# Patient Record
Sex: FEMALE | Race: WHITE | Employment: FULL TIME | URBAN - METROPOLITAN AREA
[De-identification: names, ages, dates, MRNs, and addresses within clinical notes are randomized per-mention and may not be internally consistent; named-entity substitution may affect disease eponyms.]

---

## 2017-04-27 ENCOUNTER — ANESTHESIA EVENT (OUTPATIENT)
Dept: SURGERY | Age: 52
End: 2017-04-27
Payer: COMMERCIAL

## 2017-04-28 ENCOUNTER — APPOINTMENT (OUTPATIENT)
Dept: GENERAL RADIOLOGY | Age: 52
End: 2017-04-28
Attending: ORTHOPAEDIC SURGERY
Payer: COMMERCIAL

## 2017-04-28 ENCOUNTER — ANESTHESIA (OUTPATIENT)
Dept: SURGERY | Age: 52
End: 2017-04-28
Payer: COMMERCIAL

## 2017-04-28 ENCOUNTER — HOSPITAL ENCOUNTER (OUTPATIENT)
Age: 52
Setting detail: OUTPATIENT SURGERY
Discharge: HOME | End: 2017-04-28
Attending: ORTHOPAEDIC SURGERY | Admitting: ORTHOPAEDIC SURGERY
Payer: COMMERCIAL

## 2017-04-28 VITALS
HEART RATE: 88 BPM | SYSTOLIC BLOOD PRESSURE: 118 MMHG | BODY MASS INDEX: 45.31 KG/M2 | WEIGHT: 240 LBS | DIASTOLIC BLOOD PRESSURE: 76 MMHG | TEMPERATURE: 97.8 F | HEIGHT: 61 IN | OXYGEN SATURATION: 93 % | RESPIRATION RATE: 16 BRPM

## 2017-04-28 LAB — GLUCOSE BLD STRIP.AUTO-MCNC: 139 MG/DL (ref 65–100)

## 2017-04-28 PROCEDURE — 76010010054 HC POST OP PAIN BLOCK: Performed by: ORTHOPAEDIC SURGERY

## 2017-04-28 PROCEDURE — 77030008593 HC TRAP FNGR MESH ALLN -B: Performed by: ORTHOPAEDIC SURGERY

## 2017-04-28 PROCEDURE — 77030008853 HC WRE K TRIM -B: Performed by: ORTHOPAEDIC SURGERY

## 2017-04-28 PROCEDURE — 74011250636 HC RX REV CODE- 250/636: Performed by: ORTHOPAEDIC SURGERY

## 2017-04-28 PROCEDURE — 77030020754 HC CUF TRNQT 2BLA STRY -B: Performed by: ORTHOPAEDIC SURGERY

## 2017-04-28 PROCEDURE — C1713 ANCHOR/SCREW BN/BN,TIS/BN: HCPCS | Performed by: ORTHOPAEDIC SURGERY

## 2017-04-28 PROCEDURE — 77030002966 HC SUT PDS J&J -A: Performed by: ORTHOPAEDIC SURGERY

## 2017-04-28 PROCEDURE — 82962 GLUCOSE BLOOD TEST: CPT

## 2017-04-28 PROCEDURE — 76942 ECHO GUIDE FOR BIOPSY: CPT | Performed by: ORTHOPAEDIC SURGERY

## 2017-04-28 PROCEDURE — 76010000161 HC OR TIME 1 TO 1.5 HR INTENSV-TIER 1: Performed by: ORTHOPAEDIC SURGERY

## 2017-04-28 PROCEDURE — 74011250636 HC RX REV CODE- 250/636: Performed by: ANESTHESIOLOGY

## 2017-04-28 PROCEDURE — 74011250636 HC RX REV CODE- 250/636

## 2017-04-28 PROCEDURE — 77030002933 HC SUT MCRYL J&J -A: Performed by: ORTHOPAEDIC SURGERY

## 2017-04-28 PROCEDURE — 77030003602 HC NDL NRV BLK BBMI -B: Performed by: ANESTHESIOLOGY

## 2017-04-28 PROCEDURE — 77030003867

## 2017-04-28 PROCEDURE — 77030002916 HC SUT ETHLN J&J -A: Performed by: ORTHOPAEDIC SURGERY

## 2017-04-28 PROCEDURE — 74011000250 HC RX REV CODE- 250

## 2017-04-28 PROCEDURE — 77030003737 HC BIT DRL ACMD -C: Performed by: ORTHOPAEDIC SURGERY

## 2017-04-28 PROCEDURE — 76210000026 HC REC RM PH II 1 TO 1.5 HR: Performed by: ORTHOPAEDIC SURGERY

## 2017-04-28 PROCEDURE — 76060000033 HC ANESTHESIA 1 TO 1.5 HR: Performed by: ORTHOPAEDIC SURGERY

## 2017-04-28 PROCEDURE — 77030011640 HC PAD GRND REM COVD -A: Performed by: ORTHOPAEDIC SURGERY

## 2017-04-28 PROCEDURE — 77030018836 HC SOL IRR NACL ICUM -A: Performed by: ORTHOPAEDIC SURGERY

## 2017-04-28 PROCEDURE — 77030003900 HC BIT DRL TWST TRIM -C: Performed by: ORTHOPAEDIC SURGERY

## 2017-04-28 DEVICE — PEG WRST THRD VOLAR 16MM --: Type: IMPLANTABLE DEVICE | Site: WRIST | Status: FUNCTIONAL

## 2017-04-28 DEVICE — SCREW BNE L10MM DIA3.2MM CORT EL FA NONLOCKING: Type: IMPLANTABLE DEVICE | Site: WRIST | Status: FUNCTIONAL

## 2017-04-28 DEVICE — SCR CORT LCK 3.2X12MM --: Type: IMPLANTABLE DEVICE | Site: WRIST | Status: FUNCTIONAL

## 2017-04-28 DEVICE — PEG WRST THRD VOLAR 20MM --: Type: IMPLANTABLE DEVICE | Site: WRIST | Status: FUNCTIONAL

## 2017-04-28 DEVICE — IMPLANTABLE DEVICE: Type: IMPLANTABLE DEVICE | Site: WRIST | Status: FUNCTIONAL

## 2017-04-28 DEVICE — PLATE VOLAR 4 DIS PEG 3HLE LF --: Type: IMPLANTABLE DEVICE | Site: WRIST | Status: FUNCTIONAL

## 2017-04-28 RX ORDER — MIDAZOLAM HYDROCHLORIDE 1 MG/ML
INJECTION, SOLUTION INTRAMUSCULAR; INTRAVENOUS AS NEEDED
Status: DISCONTINUED | OUTPATIENT
Start: 2017-04-28 | End: 2017-04-28 | Stop reason: HOSPADM

## 2017-04-28 RX ORDER — MIDAZOLAM HYDROCHLORIDE 1 MG/ML
2 INJECTION, SOLUTION INTRAMUSCULAR; INTRAVENOUS
Status: DISCONTINUED | OUTPATIENT
Start: 2017-04-28 | End: 2017-04-28 | Stop reason: HOSPADM

## 2017-04-28 RX ORDER — NALOXONE HYDROCHLORIDE 0.4 MG/ML
0.1 INJECTION, SOLUTION INTRAMUSCULAR; INTRAVENOUS; SUBCUTANEOUS AS NEEDED
Status: DISCONTINUED | OUTPATIENT
Start: 2017-04-28 | End: 2017-04-28 | Stop reason: HOSPADM

## 2017-04-28 RX ORDER — ONDANSETRON 2 MG/ML
4 INJECTION INTRAMUSCULAR; INTRAVENOUS ONCE
Status: DISCONTINUED | OUTPATIENT
Start: 2017-04-28 | End: 2017-04-28 | Stop reason: HOSPADM

## 2017-04-28 RX ORDER — SODIUM CHLORIDE, SODIUM LACTATE, POTASSIUM CHLORIDE, CALCIUM CHLORIDE 600; 310; 30; 20 MG/100ML; MG/100ML; MG/100ML; MG/100ML
100 INJECTION, SOLUTION INTRAVENOUS CONTINUOUS
Status: DISCONTINUED | OUTPATIENT
Start: 2017-04-28 | End: 2017-04-28 | Stop reason: HOSPADM

## 2017-04-28 RX ORDER — PROPOFOL 10 MG/ML
INJECTION, EMULSION INTRAVENOUS AS NEEDED
Status: DISCONTINUED | OUTPATIENT
Start: 2017-04-28 | End: 2017-04-28 | Stop reason: HOSPADM

## 2017-04-28 RX ORDER — OXYCODONE HYDROCHLORIDE 5 MG/1
10 TABLET ORAL
Status: DISCONTINUED | OUTPATIENT
Start: 2017-04-28 | End: 2017-04-28 | Stop reason: HOSPADM

## 2017-04-28 RX ORDER — HYDROMORPHONE HYDROCHLORIDE 2 MG/ML
0.5 INJECTION, SOLUTION INTRAMUSCULAR; INTRAVENOUS; SUBCUTANEOUS
Status: DISCONTINUED | OUTPATIENT
Start: 2017-04-28 | End: 2017-04-28 | Stop reason: HOSPADM

## 2017-04-28 RX ORDER — DIPHENHYDRAMINE HYDROCHLORIDE 50 MG/ML
12.5 INJECTION, SOLUTION INTRAMUSCULAR; INTRAVENOUS
Status: DISCONTINUED | OUTPATIENT
Start: 2017-04-28 | End: 2017-04-28 | Stop reason: HOSPADM

## 2017-04-28 RX ORDER — ONDANSETRON 2 MG/ML
INJECTION INTRAMUSCULAR; INTRAVENOUS AS NEEDED
Status: DISCONTINUED | OUTPATIENT
Start: 2017-04-28 | End: 2017-04-28 | Stop reason: HOSPADM

## 2017-04-28 RX ORDER — FENTANYL CITRATE 50 UG/ML
100 INJECTION, SOLUTION INTRAMUSCULAR; INTRAVENOUS ONCE
Status: DISCONTINUED | OUTPATIENT
Start: 2017-04-28 | End: 2017-04-28 | Stop reason: HOSPADM

## 2017-04-28 RX ORDER — BUPIVACAINE HYDROCHLORIDE AND EPINEPHRINE 5; 5 MG/ML; UG/ML
INJECTION, SOLUTION EPIDURAL; INTRACAUDAL; PERINEURAL AS NEEDED
Status: DISCONTINUED | OUTPATIENT
Start: 2017-04-28 | End: 2017-04-28 | Stop reason: HOSPADM

## 2017-04-28 RX ORDER — LIDOCAINE HYDROCHLORIDE 10 MG/ML
0.1 INJECTION INFILTRATION; PERINEURAL AS NEEDED
Status: DISCONTINUED | OUTPATIENT
Start: 2017-04-28 | End: 2017-04-28 | Stop reason: HOSPADM

## 2017-04-28 RX ORDER — CEFAZOLIN SODIUM IN 0.9 % NACL 2 G/50 ML
2 INTRAVENOUS SOLUTION, PIGGYBACK (ML) INTRAVENOUS ONCE
Status: COMPLETED | OUTPATIENT
Start: 2017-04-28 | End: 2017-04-28

## 2017-04-28 RX ORDER — DEXAMETHASONE SODIUM PHOSPHATE 4 MG/ML
INJECTION, SOLUTION INTRA-ARTICULAR; INTRALESIONAL; INTRAMUSCULAR; INTRAVENOUS; SOFT TISSUE AS NEEDED
Status: DISCONTINUED | OUTPATIENT
Start: 2017-04-28 | End: 2017-04-28 | Stop reason: HOSPADM

## 2017-04-28 RX ORDER — ALBUTEROL SULFATE 0.83 MG/ML
2.5 SOLUTION RESPIRATORY (INHALATION) AS NEEDED
Status: DISCONTINUED | OUTPATIENT
Start: 2017-04-28 | End: 2017-04-28 | Stop reason: HOSPADM

## 2017-04-28 RX ORDER — MIDAZOLAM HYDROCHLORIDE 1 MG/ML
2 INJECTION, SOLUTION INTRAMUSCULAR; INTRAVENOUS ONCE
Status: COMPLETED | OUTPATIENT
Start: 2017-04-28 | End: 2017-04-28

## 2017-04-28 RX ORDER — PROPOFOL 10 MG/ML
INJECTION, EMULSION INTRAVENOUS
Status: DISCONTINUED | OUTPATIENT
Start: 2017-04-28 | End: 2017-04-28 | Stop reason: HOSPADM

## 2017-04-28 RX ADMIN — PROPOFOL 20 MG: 10 INJECTION, EMULSION INTRAVENOUS at 14:48

## 2017-04-28 RX ADMIN — ONDANSETRON 4 MG: 2 INJECTION INTRAMUSCULAR; INTRAVENOUS at 14:48

## 2017-04-28 RX ADMIN — MIDAZOLAM HYDROCHLORIDE 2 MG: 1 INJECTION, SOLUTION INTRAMUSCULAR; INTRAVENOUS at 13:04

## 2017-04-28 RX ADMIN — SODIUM CHLORIDE, SODIUM LACTATE, POTASSIUM CHLORIDE, AND CALCIUM CHLORIDE 100 ML/HR: 600; 310; 30; 20 INJECTION, SOLUTION INTRAVENOUS at 13:00

## 2017-04-28 RX ADMIN — BUPIVACAINE HYDROCHLORIDE AND EPINEPHRINE 30 ML: 5; 5 INJECTION, SOLUTION EPIDURAL; INTRACAUDAL; PERINEURAL at 13:07

## 2017-04-28 RX ADMIN — CEFAZOLIN 2 G: 1 INJECTION, POWDER, FOR SOLUTION INTRAMUSCULAR; INTRAVENOUS; PARENTERAL at 14:28

## 2017-04-28 RX ADMIN — DEXAMETHASONE SODIUM PHOSPHATE 4 MG: 4 INJECTION, SOLUTION INTRA-ARTICULAR; INTRALESIONAL; INTRAMUSCULAR; INTRAVENOUS; SOFT TISSUE at 14:46

## 2017-04-28 RX ADMIN — PROPOFOL 160 MCG/KG/MIN: 10 INJECTION, EMULSION INTRAVENOUS at 14:32

## 2017-04-28 RX ADMIN — MIDAZOLAM HYDROCHLORIDE 2 MG: 1 INJECTION, SOLUTION INTRAMUSCULAR; INTRAVENOUS at 14:32

## 2017-04-28 NOTE — OP NOTES
ORIF OF 3 PART INTRAARTICULAR DISTAL RADIUS FX     Suad Sims is a 49 y.o. female    4/28/2017    PREOP DIAGNOSIS: COMMINUTED INTRA-ARTICULAR FX OF THE RIGHT DISTAL RADIUS    POSTOP DIAGNOSIS:  COMMINUTED INTRA-ARTICULAR FX OF THE RIGHT DISTAL RADIUS    PROCEDURE:  OPEN REDUCTION INTERNAL FIXATION OF 3 - PART  INTRA-ARTICULAR FX OF THE RIGHT DISTAL RADIUS    SURGEON:  Gaby Park MD    ANESTHESIA:  Regional  Infraclavicular    INDICATIONS:  Suad Sims is a 49 y.o. female with a comminuted intra-articular fx of the right distal radius. The planned procedure and alternatives for treatment have been discussed previously. Informed consent has been obtained. The operative site has been marked and perioperative antibiotics ordered if felt to be indicated. PROCEDURAL NOTE:   The patient was brought to the OR and anesthetized for the procedure. The patient was positioned in the supine position and bony prominences and nerves padded or protected. The right upper extremity was prepped and draped as a sterile field. A time out was held with the operative team and the patient, procedure, surgical site and surgeon identified. The extremity was exsanguinated with a sterile Esmarch bandage and a tourniquet around the upper arm inflated to 250 mm HG. The fracture was manipulated finger traps placed on the index and long fingers. 10 lbs of traction was applied. The image intensifier was used to chek the reduction. The volar fragment was not rotated correctly but the dorsal fragment looked good. An incision was made over the FCR tendon. Small bleeders were electrocoagulated as needed. The deep part of the tendon sheath was opened. The pronator quadratus and the distal part of the FPL were released along the radial aspect of the radius and retracted ulnarly protecting the carpal tunnel. The fracture fragments were identified and further reduced with small elevators and dental probes. A standard TriMed Variable Angle volar plate was positioned over the fracture using the fluoroscope. It was temporarily fixed with K-wires. The proximal part of the plate was attached with cortical screws. The distal fragments were attached and stabilized with locking screws. .  During the insertion of the screws the image intensifier was used to check position and length. Adjustments were made as needed. The shaft portion of the plate was attached with one none locking screw and two locking screws. When I was satisfied with the fixation a final check was made with the image in multiple planes. The wound was irrigated. The pronator was reattached with 3-0 PDS sutures. The superficial part of the FCR sheath was repaired with 3-0 PDS as well. The subcutaneous layer was closed with inverted stitches of 4-0 Monocryl. A running 4-0 nylon was used to close the skin. The DRUJ seemed stable to rotation and manipulation. Sterile dressings of Xeroform and gauze  were placed over the incision and held in place with a wrapping of webril. A volar splint was applied and held in place with an ACE wrap. The tourniquet was deflated with good return of capillary refill to the fingers. The patient was taken to the recovery room by anesthesia. TOURNIQUET TIME:   Total Tourniquet Time Documented:  Forearm (Right) - 50 minutes  Total: Forearm (Right) - 50 minutes      IMPLANTS:   Implant Name Type Inv.  Item Serial No.  Lot No. LRB No. Used Action   PLATE VOLAR 4 DIS PEG 3HLE LF --  - [id number]  PLATE VOLAR 4 DIS PEG 3HLE LF --   TRIMED [id number] Right 1 Implanted   SCR BNE NIKKO HEX 3.2X10MM --  - [id number]  SCR BNE NIKKO HEX 3.2X10MM --   TRIMED [id number] Right 1 Implanted   PEG WRST THRD VOLAR 20MM --  - [id number]  PEG WRST THRD VOLAR 20MM --   TRIMED [id number] Right 2 Implanted   SCR NIKKO LCK 3.2X10MM --  - [id number]  SCR NIKKO LCK 3.2X10MM --   TRIMED [id number] Right 1 Implanted   SCR NIKKO LCK 3.2X12MM --  - [id number]   SCR NIKKO LCK 3.2X12MM --    TRIMED [id number] Right 1 Implanted       SIGNED: Quan Yee., MD

## 2017-04-28 NOTE — ANESTHESIA PREPROCEDURE EVALUATION
Anesthetic History               Review of Systems / Medical History  Patient summary reviewed, nursing notes reviewed and pertinent labs reviewed    Pulmonary        Sleep apnea: No treatment           Neuro/Psych              Cardiovascular    Hypertension: well controlled              Exercise tolerance: >4 METS     GI/Hepatic/Renal                Endo/Other    Diabetes: well controlled, type 2         Other Findings              Physical Exam    Airway  Mallampati: III  TM Distance: 4 - 6 cm  Neck ROM: normal range of motion   Mouth opening: Normal     Cardiovascular  Regular rate and rhythm,  S1 and S2 normal,  no murmur, click, rub, or gallop             Dental  No notable dental hx       Pulmonary  Breath sounds clear to auscultation               Abdominal         Other Findings            Anesthetic Plan    ASA: 3  Anesthesia type: total IV anesthesia      Post-op pain plan if not by surgeon: peripheral nerve block single    Induction: Intravenous  Anesthetic plan and risks discussed with: Patient and Son / Daughter

## 2017-04-28 NOTE — ANESTHESIA POSTPROCEDURE EVALUATION
Post-Anesthesia Evaluation and Assessment    Patient: Jennifer Boykin MRN: [id number]  SSN: [id number]    YOB: 1965  Age: 49 y.o. Sex: female       Cardiovascular Function/Vital Signs  Visit Vitals    /72    Pulse 93    Temp 36.6 °C (97.8 °F)    Resp 15    Ht 5' 1\" (1.549 m)    Wt 108.9 kg (240 lb)    SpO2 91%    BMI 45.35 kg/m2       Patient is status post total IV anesthesia anesthesia for Procedure(s):  RIGHT RADIUS OPEN REDUCTION INTERNAL FIXATION/  AXILARY. Nausea/Vomiting: None    Postoperative hydration reviewed and adequate. Pain:  Pain Scale 1: Numeric (0 - 10) (04/28/17 1542)  Pain Intensity 1: 0 (04/28/17 1542)   Managed    Neurological Status:   Neuro (WDL):  (drowsy) (04/28/17 1542)   At baseline    Mental Status and Level of Consciousness: Arousable    Pulmonary Status:   O2 Device: Room air (04/28/17 1542)   Adequate oxygenation and airway patent    Complications related to anesthesia: None    Post-anesthesia assessment completed.  No concerns    Signed By: Radha Steel MD     April 28, 2017

## 2017-04-28 NOTE — IP AVS SNAPSHOT
[address] 
[phone] Patient: Юлия Keys MRN: [id number] :1965 You are allergic to the following Allergen Reactions Latex, Natural Rubber Itching Nsaids (Non-Steroidal Anti-Inflammatory Drug) Swelling Sulfa (Sulfonamide Antibiotics) Swelling Adhesive Tape-Silicones Rash Ciprofloxacin Swelling Recent Documentation Height Weight BMI OB Status Smoking Status 1.549 m 108.9 kg 45.35 kg/m2 Postmenopausal Never Smoker Emergency Contacts Name Discharge Info Relation Home Work Mobile Leisa Ahuja  Other Relative [6] [phone] Karla Callahan  Child [2] [phone] About your hospitalization You were admitted on:  2017 You last received care in the:  [facility] OP PACU You were discharged on:  2017 Unit phone number:  [phone] Why you were hospitalized Your primary diagnosis was:  Not on File Providers Seen During Your Hospitalizations Provider Role Specialty Primary office phone Angelia Haywood MD Attending Provider Orthopedic Surgery [phone] Your Primary Care Physician (PCP) Primary Care Physician Office Phone Office Fax Martha Schreiber [phone] Follow-up Information Follow up With Details Comments Contact Info Raquel Alba MD   [address] [place] [id number] [phone] Current Discharge Medication List  
  
CONTINUE these medications which have CHANGED Dose & Instructions Dispensing Information Comments Morning Noon Evening Bedtime  
 canagliflozin-metformin 150-500 mg Tbph  
Commonly known as:  INVOKAMET XR What changed:  when to take this Your last dose was: Your next dose is:    
   
   
 Dose:  2 Tab Take 2 Tabs by mouth daily. Quantity:  60 Tab Refills:  11 FLUoxetine 20 mg capsule Commonly known as:  PROzac What changed:   
- how much to take 
- how to take this - when to take this 
- additional instructions Your last dose was: Your next dose is: TAKE 2 CAPSULES BY MOUTH EVERY DAY Quantity:  180 Cap Refills:  3  
     
   
   
   
  
 lisinopril 5 mg tablet Commonly known as:  Rakel Odonnell What changed:   
- how much to take 
- how to take this - when to take this 
- additional instructions Your last dose was: Your next dose is: TAKE 1 TABLET BY MOUTH EVERY DAY Quantity:  90 Tab Refills:  3  
     
   
   
   
  
 omeprazole 40 mg capsule Commonly known as:  PRILOSEC What changed:   
- how much to take 
- how to take this - when to take this 
- additional instructions Your last dose was: Your next dose is: TAKE 1 CAPSULE BY MOUTH DAILY. Quantity:  90 Cap Refills:  3 CONTINUE these medications which have NOT CHANGED Dose & Instructions Dispensing Information Comments Morning Noon Evening Bedtime ALLEGRA 180 mg tablet Generic drug:  fexofenadine Your last dose was: Your next dose is:    
   
   
 Dose:  180 mg Take 180 mg by mouth every morning. Indications: Allergic Conjunctivitis, ALLERGIC RHINITIS Refills:  0 FIBER LAXATIVE (CA POLYCARBO) 625 mg tablet Generic drug:  calcium polycarbophil Your last dose was: Your next dose is:    
   
   
 Dose:  625 mg Take 625 mg by mouth daily. Refills:  0  
     
   
   
   
  
 fluticasone 50 mcg/actuation nasal spray Commonly known as:  Ala Lips Your last dose was: Your next dose is:    
   
   
 Dose:  2 Spray 2 Sprays by Both Nostrils route daily. Quantity:  1 Bottle Refills:  3 MUCINEX 1,200 mg Ta12 ER tablet Generic drug:  guaiFENesin Your last dose was: Your next dose is:    
   
   
 Dose:  1200 mg Take 1,200 mg by mouth two (2) times a day. Refills:  0 Discharge Instructions POST OP INSTRUCTIONS 1. Patient has appointment for 5/8/17 at 9:10 AM at the [address] [place] [address]. 2.  For problems call Dr Bubba Denise, [address] [facility],  [phone] Appointments only,  [phone] 
 
3. Ice and elevate the surgical site. 4.  Keep splints and dressing dry and intact. 5.  If able to tolerate, take   Acetaminophen 325 mg  2 every 4 hrs   And  Aleve 220 mg 2 every 12 hrs to help with pain ( Do not take  Aleve if taking any other anti inflamatory  drug, NSAID, or anti arthritis drug or if taking blood thinners.) ·                                            Vitamin C   500 mg  Once a day for 2 months. DIET · Clear liquids until no nausea or vomiting; then light diet for the first day. · Advance to regular diet on second day, unless your doctor orders otherwise. · If nausea and vomiting continues, call your doctor. PAIN 
· Take pain medication as directed by your doctor. · Call your doctor if pain is NOT relieved by medication. CALL YOUR DOCTOR IF  
· Excessive bleeding that does not stop after holding pressure over the area · Temperature of 101 degrees F or above · Excessive redness, swelling or bruising, and/ or green or yellow, smelly discharge from incision AFTER ANESTHESIA · For the first 24 hours: DO NOT Drive, Drink alcoholic beverages, or Make important decisions. · Be aware of dizziness following anesthesia and while taking pain medication. YOUR DOCTOR'S PHONE NUMBER [phone] DISCHARGE SUMMARY from Nurse PATIENT INSTRUCTIONS: 
 
After general anesthesia or intravenous sedation, for 24 hours or while taking prescription Narcotics: · Limit your activities · Do not drive and operate hazardous machinery · Do not make important personal or business decisions · Do  not drink alcoholic beverages · If you have not urinated within 8 hours after discharge, please contact your surgeon on call. *  Please give a list of your current medications to your Primary Care Provider. *  Please update this list whenever your medications are discontinued, doses are 
    changed, or new medications (including over-the-counter products) are added. *  Please carry medication information at all times in case of emergency situations. These are general instructions for a healthy lifestyle: No smoking/ No tobacco products/ Avoid exposure to second hand smoke Surgeon General's Warning:  Quitting smoking now greatly reduces serious risk to your health. Obesity, smoking, and sedentary lifestyle greatly increases your risk for illness A healthy diet, regular physical exercise & weight monitoring are important for maintaining a healthy lifestyle You may be retaining fluid if you have a history of heart failure or if you experience any of the following symptoms:  Weight gain of 3 pounds or more overnight or 5 pounds in a week, increased swelling in our hands or feet or shortness of breath while lying flat in bed. Please call your doctor as soon as you notice any of these symptoms; do not wait until your next office visit. Recognize signs and symptoms of STROKE: 
 
F-face looks uneven A-arms unable to move or move unevenly S-speech slurred or non-existent T-time-call 911 as soon as signs and symptoms begin-DO NOT go Back to bed or wait to see if you get better-TIME IS BRAIN. Discharge Orders None Introducing [facility]! [place] [employer] introduces [employer] patient portal. Now you can access parts of your medical record, email your doctor's office, and request medication refills online. 1. In your internet browser, go to [email or web address] 2. Click on the First Time User? Click Here link in the Sign In box. You will see the New Member Sign Up page. 3. Enter your [employer] Access Code exactly as it appears below. You will not need to use this code after youve completed the sign-up process. If you do not sign up before the expiration date, you must request a new code. · [employer] Access Code: [email or web address] Expires: 7/26/2017  1:17 PM 
 
4. Enter the last four digits of your Social Security Number (xxxx) and Date of Birth (mm/dd/yyyy) as indicated and click Submit. You will be taken to the next sign-up page. 5. Create a [employer] ID. This will be your [employer] login ID and cannot be changed, so think of one that is secure and easy to remember. 6. Create a [employer] password. You can change your password at any time. 7. Enter your Password Reset Question and Answer. This can be used at a later time if you forget your password. 8. Enter your e-mail address. You will receive e-mail notification when new information is available in [address] 9. Click Sign Up. You can now view and download portions of your medical record. 10. Click the Download Summary menu link to download a portable copy of your medical information. If you have questions, please visit the Frequently Asked Questions section of the [employer] website. Remember, [employer] is NOT to be used for urgent needs. For medical emergencies, dial 911. Now available from your iPhone and Android! General Information Please provide this summary of care documentation to your next provider. Patient Signature:  ____________________________________________________________ Date:  ____________________________________________________________  
  
Carlena Colt Provider Signature:  ____________________________________________________________ Date:  ____________________________________________________________

## 2017-04-28 NOTE — ANESTHESIA PROCEDURE NOTES
Peripheral Block    Start time: 4/28/2017 1:04 PM  End time: 4/28/2017 1:07 PM  Performed by: Poonam Mckeon  Authorized by: Poonam Mckeon       Pre-procedure:    Indications: at surgeon's request and post-op pain management    Preanesthetic Checklist: patient identified, risks and benefits discussed, site marked, timeout performed, anesthesia consent given and patient being monitored    Timeout Time: 13:04          Block Type:   Block Type:  Supraclavicular  Laterality:  Right  Monitoring:  Standard ASA monitoring, continuous pulse ox, frequent vital sign checks, heart rate, oxygen and responsive to questions  Injection Technique:  Single shot  Procedures: ultrasound guided    Patient Position: supine  Prep: chlorhexidine    Location:  Supraclavicular  Needle Type:  Stimuplex  Needle Gauge:  21 G  Needle Localization:  Anatomical landmarks  Medication Injected:  0.5%  bupivacaine  Adds:  Epi 1:200K  Volume (mL):  30    Assessment:  Number of attempts:  1  Injection Assessment:  Incremental injection every 5 mL, negative aspiration for blood, no paresthesia and no intravascular symptoms  Patient tolerance:  Patient tolerated the procedure well with no immediate complications

## 2017-04-28 NOTE — DISCHARGE INSTRUCTIONS
POST OP INSTRUCTIONS      1. Patient has appointment for 5/8/17 at 9:10 AM at the [address] [place] [address]. 2.  For problems call Dr Bobby Beasley, [address] [facility],  [phone]          Appointments only,  [phone]    3. Ice and elevate the surgical site. 4.  Keep splints and dressing dry and intact. 5.  If able to tolerate, take   Acetaminophen 325 mg  2 every 4 hrs   And  Aleve 220 mg 2 every 12 hrs to help with pain                                                   ( Do not take  Aleve if taking any other anti inflamatory  drug, NSAID, or anti arthritis drug or if taking blood thinners.)      ·                                            Vitamin C   500 mg  Once a day for 2 months. DIET  · Clear liquids until no nausea or vomiting; then light diet for the first day. · Advance to regular diet on second day, unless your doctor orders otherwise. · If nausea and vomiting continues, call your doctor. PAIN  · Take pain medication as directed by your doctor. · Call your doctor if pain is NOT relieved by medication. CALL YOUR DOCTOR IF   · Excessive bleeding that does not stop after holding pressure over the area  · Temperature of 101 degrees F or above  · Excessive redness, swelling or bruising, and/ or green or yellow, smelly discharge from incision    AFTER ANESTHESIA   · For the first 24 hours: DO NOT Drive, Drink alcoholic beverages, or Make important decisions. · Be aware of dizziness following anesthesia and while taking pain medication.          YOUR DOCTOR'S PHONE NUMBER [phone]      DISCHARGE SUMMARY from Nurse    PATIENT INSTRUCTIONS:    After general anesthesia or intravenous sedation, for 24 hours or while taking prescription Narcotics:  · Limit your activities  · Do not drive and operate hazardous machinery  · Do not make important personal or business decisions  · Do  not drink alcoholic beverages  · If you have not urinated within 8 hours after discharge, please contact your surgeon on call. *  Please give a list of your current medications to your Primary Care Provider. *  Please update this list whenever your medications are discontinued, doses are      changed, or new medications (including over-the-counter products) are added. *  Please carry medication information at all times in case of emergency situations. These are general instructions for a healthy lifestyle:    No smoking/ No tobacco products/ Avoid exposure to second hand smoke    Surgeon General's Warning:  Quitting smoking now greatly reduces serious risk to your health. Obesity, smoking, and sedentary lifestyle greatly increases your risk for illness    A healthy diet, regular physical exercise & weight monitoring are important for maintaining a healthy lifestyle    You may be retaining fluid if you have a history of heart failure or if you experience any of the following symptoms:  Weight gain of 3 pounds or more overnight or 5 pounds in a week, increased swelling in our hands or feet or shortness of breath while lying flat in bed. Please call your doctor as soon as you notice any of these symptoms; do not wait until your next office visit. Recognize signs and symptoms of STROKE:    F-face looks uneven    A-arms unable to move or move unevenly    S-speech slurred or non-existent    T-time-call 911 as soon as signs and symptoms begin-DO NOT go       Back to bed or wait to see if you get better-TIME IS BRAIN.

## 2017-04-28 NOTE — IP AVS SNAPSHOT
Current Discharge Medication List  
  
CONTINUE these medications which have CHANGED Dose & Instructions Dispensing Information Comments Morning Noon Evening Bedtime  
 canagliflozin-metformin 150-500 mg Tbph  
Commonly known as:  INVOKAMET XR What changed:  when to take this Your last dose was: Your next dose is:    
   
   
 Dose:  2 Tab Take 2 Tabs by mouth daily. Quantity:  60 Tab Refills:  11 FLUoxetine 20 mg capsule Commonly known as:  PROzac What changed:   
- how much to take 
- how to take this - when to take this 
- additional instructions Your last dose was: Your next dose is: TAKE 2 CAPSULES BY MOUTH EVERY DAY Quantity:  180 Cap Refills:  3  
     
   
   
   
  
 lisinopril 5 mg tablet Commonly known as:  Dormary lou Barbozas What changed:   
- how much to take 
- how to take this - when to take this 
- additional instructions Your last dose was: Your next dose is: TAKE 1 TABLET BY MOUTH EVERY DAY Quantity:  90 Tab Refills:  3  
     
   
   
   
  
 omeprazole 40 mg capsule Commonly known as:  PRILOSEC What changed:   
- how much to take 
- how to take this - when to take this 
- additional instructions Your last dose was: Your next dose is: TAKE 1 CAPSULE BY MOUTH DAILY. Quantity:  90 Cap Refills:  3 CONTINUE these medications which have NOT CHANGED Dose & Instructions Dispensing Information Comments Morning Noon Evening Bedtime ALLEGRA 180 mg tablet Generic drug:  fexofenadine Your last dose was: Your next dose is:    
   
   
 Dose:  180 mg Take 180 mg by mouth every morning. Indications: Allergic Conjunctivitis, ALLERGIC RHINITIS Refills:  0 FIBER LAXATIVE (CA POLYCARBO) 625 mg tablet Generic drug:  calcium polycarbophil Your last dose was: Your next dose is:    
   
   
 Dose:  625 mg Take 625 mg by mouth daily. Refills:  0  
     
   
   
   
  
 fluticasone 50 mcg/actuation nasal spray Commonly known as:  Edita Dawn Your last dose was: Your next dose is:    
   
   
 Dose:  2 Spray 2 Sprays by Both Nostrils route daily. Quantity:  1 Bottle Refills:  3 MUCINEX 1,200 mg Ta12 ER tablet Generic drug:  guaiFENesin Your last dose was: Your next dose is:    
   
   
 Dose:  1200 mg Take 1,200 mg by mouth two (2) times a day. Refills:  0

## 2017-05-02 NOTE — PERIOP NOTES
5/2/2017 1012,  Update/Correction to the chart.  Drill bit 2.3/080, #[id number] Qty 1 2.3mm Quick Release Drill Bit Trimed Tray

## 2017-11-21 ENCOUNTER — HOSPITAL ENCOUNTER (EMERGENCY)
Age: 52
Discharge: HOME | End: 2017-11-21
Attending: STUDENT IN AN ORGANIZED HEALTH CARE EDUCATION/TRAINING PROGRAM
Payer: COMMERCIAL

## 2017-11-21 VITALS
BODY MASS INDEX: 44.18 KG/M2 | WEIGHT: 234 LBS | RESPIRATION RATE: 18 BRPM | HEIGHT: 61 IN | HEART RATE: 78 BPM | TEMPERATURE: 98.1 F | DIASTOLIC BLOOD PRESSURE: 85 MMHG | OXYGEN SATURATION: 94 % | SYSTOLIC BLOOD PRESSURE: 137 MMHG

## 2017-11-21 DIAGNOSIS — J02.9 ACUTE PHARYNGITIS, UNSPECIFIED ETIOLOGY: Primary | ICD-10-CM

## 2017-11-21 LAB — DEPRECATED S PYO AG THROAT QL EIA: NEGATIVE

## 2017-11-21 PROCEDURE — 87081 CULTURE SCREEN ONLY: CPT | Performed by: EMERGENCY MEDICINE

## 2017-11-21 PROCEDURE — 99282 EMERGENCY DEPT VISIT SF MDM: CPT | Performed by: STUDENT IN AN ORGANIZED HEALTH CARE EDUCATION/TRAINING PROGRAM

## 2017-11-21 PROCEDURE — 87880 STREP A ASSAY W/OPTIC: CPT | Performed by: STUDENT IN AN ORGANIZED HEALTH CARE EDUCATION/TRAINING PROGRAM

## 2017-11-21 RX ORDER — AMOXICILLIN AND CLAVULANATE POTASSIUM 875; 125 MG/1; MG/1
1 TABLET, FILM COATED ORAL 2 TIMES DAILY
Qty: 14 TAB | Refills: 0 | Status: SHIPPED | OUTPATIENT
Start: 2017-11-21 | End: 2017-12-21

## 2017-11-21 NOTE — DISCHARGE INSTRUCTIONS
Sore Throat: Care Instructions  Your Care Instructions    Infection by bacteria or a virus causes most sore throats. Cigarette smoke, dry air, air pollution, allergies, and yelling can also cause a sore throat. Sore throats can be painful and annoying. Fortunately, most sore throats go away on their own. If you have a bacterial infection, your doctor may prescribe antibiotics. Follow-up care is a key part of your treatment and safety. Be sure to make and go to all appointments, and call your doctor if you are having problems. It's also a good idea to know your test results and keep a list of the medicines you take. How can you care for yourself at home? · If your doctor prescribed antibiotics, take them as directed. Do not stop taking them just because you feel better. You need to take the full course of antibiotics. · Gargle with warm salt water once an hour to help reduce swelling and relieve discomfort. Use 1 teaspoon of salt mixed in 1 cup of warm water. · Take an over-the-counter pain medicine, such as acetaminophen (Tylenol), ibuprofen (Advil, Motrin), or naproxen (Aleve). Read and follow all instructions on the label. · Be careful when taking over-the-counter cold or flu medicines and Tylenol at the same time. Many of these medicines have acetaminophen, which is Tylenol. Read the labels to make sure that you are not taking more than the recommended dose. Too much acetaminophen (Tylenol) can be harmful. · Drink plenty of fluids. Fluids may help soothe an irritated throat. Hot fluids, such as tea or soup, may help decrease throat pain. · Use over-the-counter throat lozenges to soothe pain. Regular cough drops or hard candy may also help. These should not be given to young children because of the risk of choking. · Do not smoke or allow others to smoke around you. If you need help quitting, talk to your doctor about stop-smoking programs and medicines.  These can increase your chances of quitting for good. · Use a vaporizer or humidifier to add moisture to your bedroom. Follow the directions for cleaning the machine. When should you call for help? Call your doctor now or seek immediate medical care if:  ? · You have new or worse trouble swallowing. ? · Your sore throat gets much worse on one side. ? Watch closely for changes in your health, and be sure to contact your doctor if you do not get better as expected. Where can you learn more? Go to http://roberto-lester.info/. Enter [phone] [id number] in the search box to learn more about \"Sore Throat: Care Instructions. \"  Current as of: May 12, 2017  Content Version: 11.4  © 1530-7393 [employer]. Care instructions adapted under license by [employer] (which disclaims liability or warranty for this information). If you have questions about a medical condition or this instruction, always ask your healthcare professional. [address] any warranty or liability for your use of this information.
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
## 2017-11-21 NOTE — ED TRIAGE NOTES
Pt complains of pain with swallowing and sinus drainage beginning tonight. States she is recovering from a cold over a week.

## 2017-11-21 NOTE — ED NOTES
I have reviewed discharge instructions with the patient. The patient verbalized understanding. Patient left ED via Discharge Method: ambulatory to Home with self. Opportunity for questions and clarification provided. Patient given 1 scripts. To continue your aftercare when you leave the hospital, you may receive an automated call from our care team to check in on how you are doing. This is a free service and part of our promise to provide the best care and service to meet your aftercare needs.  If you have questions, or wish to unsubscribe from this service please call [phone]. Thank you for Choosing our [facility] Emergency Department.

## 2017-11-23 LAB
BACTERIA SPEC CULT: NORMAL
SERVICE CMNT-IMP: NORMAL

## 2018-07-03 ENCOUNTER — HOSPITAL ENCOUNTER (OUTPATIENT)
Dept: MAMMOGRAPHY | Age: 53
Discharge: HOME | End: 2018-07-03
Attending: INTERNAL MEDICINE
Payer: COMMERCIAL

## 2018-07-03 DIAGNOSIS — N95.1 MENOPAUSAL STATE: ICD-10-CM

## 2018-07-03 DIAGNOSIS — Z12.39 SCREENING FOR MALIGNANT NEOPLASM OF BREAST: ICD-10-CM

## 2018-07-03 PROCEDURE — 77080 DXA BONE DENSITY AXIAL: CPT

## 2018-07-03 PROCEDURE — 77067 SCR MAMMO BI INCL CAD: CPT

## 2018-07-05 NOTE — PROGRESS NOTES
Bones are in the osteoporosis range. We can discuss the treatment options when she comes for her f/u appointment.

## 2018-07-06 NOTE — PROGRESS NOTES
Pt called back looking for her results. Called pt back LM that her bones are in the osteoporosis range. We can discuss the treatment options when she comes for her f/u appointment.

## 2018-08-30 PROBLEM — M81.8 OTHER OSTEOPOROSIS WITHOUT CURRENT PATHOLOGICAL FRACTURE: Chronic | Status: ACTIVE | Noted: 2018-08-30

## 2020-02-24 ENCOUNTER — HOSPITAL ENCOUNTER (OUTPATIENT)
Dept: MAMMOGRAPHY | Age: 55
Discharge: HOME | End: 2020-02-24
Attending: INTERNAL MEDICINE
Payer: COMMERCIAL

## 2020-02-24 DIAGNOSIS — Z12.31 OTHER SCREENING MAMMOGRAM: ICD-10-CM

## 2020-02-24 PROCEDURE — 77067 SCR MAMMO BI INCL CAD: CPT

## 2022-03-18 PROBLEM — M81.8 OTHER OSTEOPOROSIS WITHOUT CURRENT PATHOLOGICAL FRACTURE: Status: ACTIVE | Noted: 2018-08-30

## (undated) DEVICE — SURGICAL PROCEDURE PACK BASIC ST FRANCIS

## (undated) DEVICE — (D)STRIP SKN CLSR 0.5X4IN WHT --

## (undated) DEVICE — SUT ETHLN 4-0 18IN PS2 BLK --

## (undated) DEVICE — BIT DRL TWST 1.8X90MM DISP --

## (undated) DEVICE — 2000CC GUARDIAN II: Brand: GUARDIAN

## (undated) DEVICE — INTENDED FOR TISSUE SEPARATION, AND OTHER PROCEDURES THAT REQUIRE A SHARP SURGICAL BLADE TO PUNCTURE OR CUT.: Brand: BARD-PARKER ® STAINLESS STEEL BLADES

## (undated) DEVICE — WIRE K 1.1X100MM --

## (undated) DEVICE — (D)PREP SKN CHLRAPRP APPL 26ML -- CONVERT TO ITEM 371833

## (undated) DEVICE — SUTURE PDS II SZ 3-0 L27IN ABSRB VLT L26MM SH 1/2 CIR Z316H

## (undated) DEVICE — PADDING CAST COHESIVE 4 YDX3 IN HND TEARABLE COTTON SPEC 100

## (undated) DEVICE — CARDINAL HEALTH FLEXIBLE LIGHT HANDLE COVER: Brand: CARDINAL HEALTH

## (undated) DEVICE — DRESSING,GAUZE,XEROFORM,CURAD,1"X8",ST: Brand: CURAD

## (undated) DEVICE — STERILE SLEEVE: Brand: CONVERTORS

## (undated) DEVICE — TRAP TRAC L FNGR MESH SGL COLOR CODED MIX MTCH DISPOSABLE

## (undated) DEVICE — BUTTON SWITCH PENCIL BLADE ELECTRODE, HOLSTER: Brand: EDGE

## (undated) DEVICE — SUTURE MCRYL SZ 4-0 L27IN ABSRB UD L19MM PS-2 1/2 CIR PRIM Y426H

## (undated) DEVICE — REM POLYHESIVE ADULT PATIENT RETURN ELECTRODE: Brand: VALLEYLAB

## (undated) DEVICE — NEEDLE HYPO 25GA L1.5IN BLU POLYPR HUB S STL REG BVL STR

## (undated) DEVICE — 2.3MM QUICK RELEASE DRILL: Brand: ACUMED

## (undated) DEVICE — SOLUTION IV 1000ML 0.9% SOD CHL

## (undated) DEVICE — MASTISOL ADHESIVE LIQ 2/3ML

## (undated) DEVICE — DISPOSABLE TOURNIQUET CUFF SINGLE BLADDER, DUAL PORT AND QUICK CONNECT CONNECTOR: Brand: COLOR CUFF

## (undated) DEVICE — IMPLANTABLE DEVICE
Type: IMPLANTABLE DEVICE | Site: WRIST | Status: NON-FUNCTIONAL
Removed: 2017-04-28

## (undated) DEVICE — DRAPE XR C ARM 41X74IN LF --

## (undated) DEVICE — STERILE HOOK LOCK LATEX FREE ELASTIC BANDAGE 3INX5YD: Brand: HOOK LOCK™